# Patient Record
(demographics unavailable — no encounter records)

---

## 2025-01-30 NOTE — DISCUSSION/SUMMARY
[de-identified] : Impression: Left knee injury possible subluxation and patellofemoral syndrome  Plan: Patient was advised for ice, anti-inflammatories for pain, rest, and no sporting activities at this time. Prescription for physical therapy was given.  Activity as tolerated.  Follow-up: As needed.

## 2025-01-30 NOTE — HISTORY OF PRESENT ILLNESS
[de-identified] : 15-year-old male here for an evaluation of injury sustained to the left knee, this injury happened January 29, 2025, while he was playing football. Patient states that he jumped and as he planted his foot on the ground, he felt a pop in his knee. Since then, he is having pain on his knee, the pain is about 5/10, pain with flexion of the knee.

## 2025-01-30 NOTE — IMAGING
[de-identified] : Examination of the left knee, mild generalized swelling, no ecchymosis, no erythema. Patient has pain on the patella facets. Mildly positive patella femoral glide. Mild maltracking of the patella. Patient is able to do active straight leg raise. Good motor strength with knee flexion and knee extension. No focal tenderness to palpation over the medial or lateral joint. Negative Giovani's. No signs of instability about the knee. Neurovascular intact.  Radiographs of the left knee was done in office today, 4 views were taken including skyline view. X-rays were negative for any acute fracture or dislocation.